# Patient Record
Sex: FEMALE | Race: WHITE | NOT HISPANIC OR LATINO
[De-identification: names, ages, dates, MRNs, and addresses within clinical notes are randomized per-mention and may not be internally consistent; named-entity substitution may affect disease eponyms.]

---

## 2017-01-31 ENCOUNTER — HOSPITAL ENCOUNTER (OUTPATIENT)
Dept: LAB | Age: 37
Setting detail: SPECIMEN
Discharge: HOME | End: 2017-01-31

## 2017-01-31 ENCOUNTER — OFFICE VISIT - HEALTHEAST (OUTPATIENT)
Dept: FAMILY MEDICINE | Facility: CLINIC | Age: 37
End: 2017-01-31

## 2017-01-31 DIAGNOSIS — J02.9 VIRAL PHARYNGITIS: ICD-10-CM

## 2017-01-31 DIAGNOSIS — J02.9 SORE THROAT: ICD-10-CM

## 2017-05-17 ENCOUNTER — OFFICE VISIT (OUTPATIENT)
Dept: URGENT CARE | Facility: URGENT CARE | Age: 37
End: 2017-05-17
Payer: COMMERCIAL

## 2017-05-17 VITALS
HEIGHT: 68 IN | WEIGHT: 144 LBS | DIASTOLIC BLOOD PRESSURE: 54 MMHG | HEART RATE: 83 BPM | BODY MASS INDEX: 21.82 KG/M2 | TEMPERATURE: 98.8 F | SYSTOLIC BLOOD PRESSURE: 90 MMHG | OXYGEN SATURATION: 98 %

## 2017-05-17 DIAGNOSIS — R07.0 THROAT PAIN: Primary | ICD-10-CM

## 2017-05-17 LAB
DEPRECATED S PYO AG THROAT QL EIA: NORMAL
MICRO REPORT STATUS: NORMAL
SPECIMEN SOURCE: NORMAL

## 2017-05-17 PROCEDURE — 87880 STREP A ASSAY W/OPTIC: CPT | Performed by: FAMILY MEDICINE

## 2017-05-17 PROCEDURE — 87081 CULTURE SCREEN ONLY: CPT | Performed by: FAMILY MEDICINE

## 2017-05-17 PROCEDURE — 99213 OFFICE O/P EST LOW 20 MIN: CPT | Performed by: FAMILY MEDICINE

## 2017-05-17 NOTE — NURSING NOTE
"Chief Complaint   Patient presents with     Urgent Care     Pharyngitis     Pt's kids have strep throat.  Has had sore throat x 1 week.       Initial BP 90/54  Pulse 83  Temp 98.8  F (37.1  C) (Oral)  Ht 5' 8\" (1.727 m)  Wt 144 lb (65.3 kg)  LMP 05/14/2017 (Exact Date)  SpO2 98%  BMI 21.9 kg/m2 Estimated body mass index is 21.9 kg/(m^2) as calculated from the following:    Height as of this encounter: 5' 8\" (1.727 m).    Weight as of this encounter: 144 lb (65.3 kg)..  BP completed using cuff size: small pepe Espana RN  "

## 2017-05-17 NOTE — MR AVS SNAPSHOT
"              After Visit Summary   2017    Karli Claros    MRN: [id number]           Patient Information     Date Of Birth          1980        Visit Information        Provider Department      2017 5:30 PM Rosi Robbins MD [facility]        Today's Diagnoses     Throat pain    -  1       Follow-ups after your visit        Follow-up notes from your care team     Return if symptoms worsen or fail to improve.      Who to contact     If you have questions or need follow up information about today's clinic visit or your schedule please contact [facility] directly at [phone].  Normal or non-critical lab and imaging results will be communicated to you by MyChart, letter or phone within 4 business days after the clinic has received the results. If you do not hear from us within 7 days, please contact the clinic through MyChart or phone. If you have a critical or abnormal lab result, we will notify you by phone as soon as possible.  Submit refill requests through Wize or call your pharmacy and they will forward the refill request to us. Please allow 3 business days for your refill to be completed.          Additional Information About Your Visit        MyChart Information     Wize lets you send messages to your doctor, view your test results, renew your prescriptions, schedule appointments and more. To sign up, go to [email or web address] . Click on \"Log in\" on the left side of the screen, which will take you to the Welcome page. Then click on \"Sign up Now\" on the right side of the page.     You will be asked to enter the access code listed below, as well as some personal information. Please follow the directions to create your username and password.     Your access code is: [email or web address]  Expires: 8/15/2017  7:03 PM     Your access code will  in 90 days. If you need help or a new code, please call your [place] " "clinic or [phone].        Care EveryWhere ID     This is your Care EveryWhere ID. This could be used by other organizations to access your [facility] medical records  [id number]        Your Vitals Were     Pulse Temperature Height Last Period Pulse Oximetry BMI (Body Mass Index)    83 98.8  F (37.1  C) (Oral) 5' 8\" (1.727 m) 05/14/2017 (Exact Date) 98% 21.9 kg/m2       Blood Pressure from Last 3 Encounters:   05/17/17 90/54   07/11/15 110/68    Weight from Last 3 Encounters:   05/17/17 144 lb (65.3 kg)   07/11/15 152 lb (68.9 kg)              We Performed the Following     Beta strep group A culture     Strep, Rapid Screen        Primary Care Provider    None Specified       No primary provider on file.        Thank you!     Thank you for choosing [facility]  for your care. Our goal is always to provide you with excellent care. Hearing back from our patients is one way we can continue to improve our services. Please take a few minutes to complete the written survey that you may receive in the mail after your visit with us. Thank you!             Your Updated Medication List - Protect others around you: Learn how to safely use, store and throw away your medicines at www.disposemymeds.org.          This list is accurate as of: 5/17/17  7:03 PM.  Always use your most recent med list.                   Brand Name Dispense Instructions for use    ADVIL PO            "

## 2017-05-18 LAB
BACTERIA SPEC CULT: NORMAL
MICRO REPORT STATUS: NORMAL
SPECIMEN SOURCE: NORMAL

## 2018-03-28 ENCOUNTER — OFFICE VISIT - HEALTHEAST (OUTPATIENT)
Dept: FAMILY MEDICINE | Facility: CLINIC | Age: 38
End: 2018-03-28

## 2018-03-28 DIAGNOSIS — H10.31 ACUTE BACTERIAL CONJUNCTIVITIS OF RIGHT EYE: ICD-10-CM

## 2018-04-02 ENCOUNTER — OFFICE VISIT - HEALTHEAST (OUTPATIENT)
Dept: FAMILY MEDICINE | Facility: CLINIC | Age: 38
End: 2018-04-02

## 2018-04-02 DIAGNOSIS — J02.9 ACUTE VIRAL PHARYNGITIS: ICD-10-CM

## 2018-04-02 DIAGNOSIS — R07.0 THROAT PAIN: ICD-10-CM

## 2018-04-02 LAB — DEPRECATED S PYO AG THROAT QL EIA: NORMAL

## 2018-04-03 LAB — GROUP A STREP BY PCR: NORMAL

## 2019-02-18 ENCOUNTER — HOSPITAL ENCOUNTER (OUTPATIENT)
Dept: ULTRASOUND IMAGING | Facility: HOSPITAL | Age: 39
Discharge: HOME | End: 2019-02-18
Attending: FAMILY MEDICINE

## 2019-02-18 ENCOUNTER — OFFICE VISIT - HEALTHEAST (OUTPATIENT)
Dept: FAMILY MEDICINE | Facility: CLINIC | Age: 39
End: 2019-02-18

## 2019-02-18 DIAGNOSIS — M25.562 POSTERIOR KNEE PAIN, LEFT: ICD-10-CM

## 2019-02-18 ASSESSMENT — MIFFLIN-ST. JEOR: SCORE: 1405.26

## 2019-04-23 ENCOUNTER — OFFICE VISIT - HEALTHEAST (OUTPATIENT)
Dept: FAMILY MEDICINE | Facility: CLINIC | Age: 39
End: 2019-04-23

## 2019-04-23 DIAGNOSIS — Z12.4 SCREENING FOR MALIGNANT NEOPLASM OF CERVIX: ICD-10-CM

## 2019-04-23 DIAGNOSIS — Z00.00 ROUTINE GENERAL MEDICAL EXAMINATION AT A HEALTH CARE FACILITY: ICD-10-CM

## 2019-04-23 DIAGNOSIS — Z13.0 SCREENING, IRON DEFICIENCY ANEMIA: ICD-10-CM

## 2019-04-23 DIAGNOSIS — Z83.49 FAMILY HISTORY OF HYPOTHYROIDISM: ICD-10-CM

## 2019-04-23 DIAGNOSIS — Z13.1 SCREENING FOR DIABETES MELLITUS: ICD-10-CM

## 2019-04-23 DIAGNOSIS — Z13.220 LIPID SCREENING: ICD-10-CM

## 2019-04-23 LAB
CHOLEST SERPL-MCNC: 175 MG/DL
FASTING STATUS PATIENT QL REPORTED: YES
FASTING STATUS PATIENT QL REPORTED: YES
GLUCOSE BLD-MCNC: 83 MG/DL (ref 70–99)
HDLC SERPL-MCNC: 51 MG/DL
HGB BLD-MCNC: 12.1 G/DL (ref 12–16)
LDLC SERPL CALC-MCNC: 109 MG/DL
TRIGL SERPL-MCNC: 77 MG/DL
TSH SERPL DL<=0.005 MIU/L-ACNC: 0.98 UIU/ML (ref 0.3–5)

## 2019-04-23 ASSESSMENT — MIFFLIN-ST. JEOR: SCORE: 1406.39

## 2019-04-24 LAB
HPV SOURCE: NORMAL
HUMAN PAPILLOMA VIRUS 16 DNA: NEGATIVE
HUMAN PAPILLOMA VIRUS 18 DNA: NEGATIVE
HUMAN PAPILLOMA VIRUS FINAL DIAGNOSIS: NORMAL
HUMAN PAPILLOMA VIRUS OTHER HR: NEGATIVE
SPECIMEN DESCRIPTION: NORMAL

## 2021-05-28 NOTE — PROGRESS NOTES
Assessment/Plan:     1. Routine general medical examination at a health care facility  Routine history and physical, updated in EMR.  Fasting labs updated as below.  Pap smear updated today.  Immunizations are up-to-date.  Plan repeat physical in 1 year.    2. Family history of hypothyroidism  Patient desires screening thyroid today.  She does have some dry skin, possibly fatigue.  She has a family history of hypothyroidism.  - Thyroid [place]    3. Screening for diabetes mellitus  Fasting blood sugar updated today.  Patient states she passed all of her glucose screens with her pregnancies.  - Glucose    4. Lipid screening  Fasting lipid profile updated today for baseline.  - Lipid [place] FASTING    5. Screening, iron deficiency anemia  Patient is currently menstruating, she does eat meat.  Screening hemoglobin performed today.  - Hemoglobin    6. Screening for malignant neoplasm of cervix  Routine Pap smear updated today.  - Gynecologic Cytology (PAP Smear)      Subjective:      Karli Claros is a 38 y.o. female who presents for an annual exam. The patient is sexually active. The patient participates in regular exercise: yes. The patient reports that there is not domestic violence in her life.  Patient overall has been feeling well and has no questions or concerns today.  She is due for an updated Pap smear.    Healthy Habits:   Regular Exercise: Yes  Sunscreen Use: Yes  Healthy Diet: Yes  Dental Visits Regularly: Yes  Seat Belt: Yes  Sexually active: Yes  Self Breast Exam Monthly:No  Lipid Profile: Yes  Glucose Screen: Yes      Immunization History   Administered Date(s) Administered     Influenza [id number], 11/03/2009     Influenza, Seasonal, Inj PF IIV3 09/29/2011     Influenza, inj, historic,unspecified 11/03/2009, 11/14/2013     Influenza, seasonal,quad inj 6-35 mos 12/17/2014     Tdap 06/29/2010, 01/23/2014     Immunization status: up to date and documented.    No exam data present    Gynecologic  History  Patient's last menstrual period was 04/16/2019.  Contraception: vasectomy  Last Pap: 9/26/13. Results were: normal and HPV neg      OB History   No data available       Current Outpatient Medications   Medication Sig Dispense Refill     magnesium oxide 400 mg cap Take by mouth.       No current facility-administered medications for this visit.      Past Medical History:   Diagnosis Date     Thrombocytopenia     Created by Conversion      Past Surgical History:   Procedure Laterality Date     RI DILATION/CURETTAGE,DIAGNOSTIC      Description: Dilation And Curettage;  Recorded: 10/06/2009;  Comments: for fetal demise     WISDOM TOOTH EXTRACTION       Patient has no known allergies.  Family History   Problem Relation Age of Onset     No Medical Problems Mother      No Medical Problems Father      Cancer Maternal Grandfather         leukemia     Breast cancer Neg Hx      Colon cancer Neg Hx      Diabetes Neg Hx      Heart disease Neg Hx      Social History     Socioeconomic History     Marital status:      Spouse name: Not on file     Number of children: Not on file     Years of education: Not on file     Highest education level: Not on file   Occupational History     Not on file   Social Needs     Financial resource strain: Not on file     Food insecurity:     Worry: Not on file     Inability: Not on file     Transportation needs:     Medical: Not on file     Non-medical: Not on file   Tobacco Use     Smoking status: Never Smoker     Smokeless tobacco: Never Used   Substance and Sexual Activity     Alcohol use: Yes     Alcohol/week: 0.0 - 1.2 oz     Drug use: No     Sexual activity: Yes     Partners: Male     Birth control/protection: None   Lifestyle     Physical activity:     Days per week: Not on file     Minutes per session: Not on file     Stress: Not on file   Relationships     Social connections:     Talks on phone: Not on file     Gets together: Not on file     Attends [employer] service: Not on  "file     Active member of club or organization: Not on file     Attends meetings of clubs or organizations: Not on file     Relationship status: Not on file     Intimate partner violence:     Fear of current or ex partner: Not on file     Emotionally abused: Not on file     Physically abused: Not on file     Forced sexual activity: Not on file   Other Topics Concern     Not on file   Social History Narrative     Not on file       Review of Systems  General:  Fatigue, not severe  Eyes: Denies problem  Ears/Nose/Throat: Denies problem  Cardiovascular: Denies problem  Respiratory:  Denies problem  Gastrointestinal:  Denies problem  Genitourinary: Denies problem  Musculoskeletal:  Denies problem  Skin: Mildly dry skin  Neurologic: Denies problem  Psychiatric: Denies problem  Endocrine: Denies problem  Heme/Lymphatic: Denies problem   Allergic/Immunologic: Denies problem        Objective:         Vitals:    04/23/19 0907   BP: 100/58   Pulse: 72   Resp: 16   Weight: 148 lb 1.6 oz (67.2 kg)   Height: 5' 8.7\" (1.745 m)     Body mass index is 22.06 kg/m .    Physical Exam:  General Appearance: Alert, cooperative, no distress, appears stated age  Head: Normocephalic, without obvious abnormality, atraumatic  Eyes: PERRL, conjunctiva/corneas clear, EOM's intact  Ears: Normal TM's and external ear canals, both ears  Nose: Nares normal, septum midline, mucosa normal, no drainage  Throat: Lips, mucosa, and tongue normal; teeth and gums normal  Neck: Supple, symmetrical, trachea midline, no adenopathy; thyroid: not enlarged, symmetric, no tenderness/mass/nodules  Back: Symmetric, no curvature, ROM normal, no CVA tenderness  Lungs: Clear to auscultation bilaterally, respirations unlabored  Breasts: No breast masses, tenderness, asymmetry, or nipple discharge  Heart: Regular rate and rhythm, S1 and S2 normal, no murmur, rub, or gallop  Abdomen: Soft, non-tender, bowel sounds active all four quadrants, no masses, no " organomegaly  Pelvic: Normally developed genitalia with no external lesions or eruptions. Vagina and cervix show no lesions, inflammation, discharge or tenderness. No cystocele, No rectocele. Uterus normal to palpation.  No adnexal mass or tenderness.  Extremities: Extremities normal, atraumatic, no cyanosis or edema  Skin: Skin color, texture, turgor normal, no rashes or lesions  Lymph nodes: Cervical, supraclavicular, and axillary nodes normal  Neurologic: Normal

## 2021-05-30 VITALS — BODY MASS INDEX: 22.5 KG/M2 | WEIGHT: 148 LBS

## 2021-06-01 VITALS — BODY MASS INDEX: 23.72 KG/M2 | WEIGHT: 156 LBS

## 2021-06-01 VITALS — BODY MASS INDEX: 23.11 KG/M2 | WEIGHT: 152 LBS

## 2021-06-02 VITALS — WEIGHT: 148.1 LBS | BODY MASS INDEX: 21.94 KG/M2 | HEIGHT: 69 IN

## 2021-06-02 VITALS — WEIGHT: 150.3 LBS | HEIGHT: 68 IN | BODY MASS INDEX: 22.78 KG/M2

## 2021-06-08 NOTE — PROGRESS NOTES
Subjective:      Patient ID: Karli Claros is a 36 y.o. female.    Chief Complaint:    HPI  Karli Claros is a 36 y.o. female who presents today complaining of sore throat and fatigue for 4 days.  She hasn't had fever that she knows of.  She did take Advil this morning.  No one else home has been ill.  She has had a little congestion but no cough.      Past Medical History   Diagnosis Date     Thrombocytopenia      Created by Conversion        Past Surgical History   Procedure Laterality Date     Pr dilation/curettage,diagnostic       Description: Dilation And Curettage;  Recorded: 10/06/2009;  Comments: for fetal demise     [place] tooth extraction         Family History   Problem Relation Age of Onset     No Medical Problems Mother      No Medical Problems Father      Cancer Maternal Grandfather      leukemia     Breast cancer Neg Hx      Colon cancer Neg Hx      Diabetes Neg Hx      Heart disease Neg Hx        Social History   Substance Use Topics     Smoking status: Never Smoker     Smokeless tobacco: Never Used     Alcohol use 0.0 - 1.2 oz/week     0 - 1 Glasses of wine, 0 - 1 Shots of liquor per week       Review of Systems    Objective:     Visit Vitals     /60     Pulse 65     Temp 98.2  F (36.8  C) (Oral)     Resp 20     Wt 148 lb (67.1 kg)     SpO2 99%     BMI 22.5 kg/m2       Physical Exam   Constitutional: She appears well-developed and well-nourished. No distress.   HENT:   Right Ear: Ear canal normal. Tympanic membrane is not erythematous, not retracted and not bulging. A middle ear effusion (clear fluid) is present.   Left Ear: Ear canal normal. Tympanic membrane is not erythematous and not retracted. A middle ear effusion (clear fluid) is present.   Mouth/Throat: Posterior oropharyngeal edema and posterior oropharyngeal erythema present. No oropharyngeal exudate or tonsillar abscesses.   Eyes: Conjunctivae are normal. Pupils are equal, round, and reactive to light.   Neck: Normal range of motion.  Neck supple.   Cardiovascular: Normal rate and regular rhythm.    No murmur heard.  Pulmonary/Chest: Effort normal and breath sounds normal. No respiratory distress. She has no wheezes. She has no rales.   Lymphadenopathy:     She has cervical adenopathy (shotty).     Procedures    Results for orders placed or performed in visit on 01/31/17   Rapid Strep A Screen-Throat   Result Value Ref Range    Rapid Strep A Antigen No Group A Strep detected No Group A Strep detected        Assessment / Plan:     1. Viral pharyngitis           Patient Instructions   Suspect probable viral etiology.    1) Confirmatory testing is pending.    2) Ibuprofen every 6 hours or Tylenol every 4 hours as needed for fever/discomfort.  3) Follow up in 7-10 days if not improving, sooner if worsening or other concerns.

## 2021-06-16 PROBLEM — Z83.49 FAMILY HISTORY OF HYPOTHYROIDISM: Status: ACTIVE | Noted: 2019-04-23

## 2021-06-16 NOTE — PROGRESS NOTES
Assessment:       Acute conjunctivitis, right       Plan:       Discussed the diagnosis and proper care of conjunctivitis.  Stressed household hygiene.  Ophthalmic drops per orders.   Discussed discarding contact lens case, cleaning/disposing of contact lenses, and discontinuing contact lens use until symptom improvement for at least 24 hours  Discussed signs of worsening infection and when to follow-up with PCP if no symptom improvement.    Patient Instructions   You were seen today for conjunctivitis. If you wear contact lenses, discontinue use until eye is white and no discharge for 24 hours. Lens case should be discarded and lenses subjected to overnight disinfection or replaced if disposable.    Management:  - Apply antibiotic drops as prescribed until 24 hours of no symptoms  - Use warm compresses to clear discharge and crust  - Encourage good hand hygiene with frequent hand washing  - Avoid itching or rubbing the eye    Reasons to come back:  - If symptoms have not improved in 3 days  - Develop excessive pus-like discharge and/or can't keep eyes open  - Develop a fever, cough, ear pain, or shortness of breath          Subjective:       History provided by patient.  Karli Claros is a 37 y.o. female who presents for evaluation of eye redness. She has noticed the above symptoms in the right eye for 2 days. Onset was gradual. Symptoms have included discharge, itching and pain. Patient denies blurred vision, foreign body sensation, photophobia and visual field deficit. Patient has history of occasional contact lens use, last use was 4 days ago.    The following portions of the patient's history were reviewed and updated as appropriate: allergies, current medications and problem list.    Review of Systems  Pertinent items are noted in HPI.     Allergies  No Known Allergies       Objective:       /72  Pulse 89  Temp 97.7  F (36.5  C) (Oral)   Resp 14  Wt 156 lb (70.8 kg)  SpO2 99%  Breastfeeding? No   BMI 23.72 kg/m2            General: alert, appears stated age, cooperative, no distress and non-toxic   Eyes:  right: conjunctivae erythematous, sclera injected. left: conjunctivae/sclera clear. PERRL. EOM intact   Vision: corrected L 10/12.5   R 10/12.5   Fluorescein:  not done

## 2021-06-24 NOTE — PROGRESS NOTES
"  Assessment/Plan:      Posterior knee pain, left  Patient's pain is most consistent with a popliteal cyst or Baker's cyst.  I do not really appreciate a lot of swelling on examination today, but she does have some pain with full flexion and more pain with weightbearing she states.  For this reason, we will investigate further with an ultrasound.  In the meantime, she will take anti-inflammatories as needed, try to avoid activities that exacerbate the pain.  - US Leg Non Vascular Left; Future        Subjective:       Karli Claros is a 38 y.o. female who presents for evaluation of swelling and discomfort behind her left knee for the past 3-4 weeks.  She has never had a history of the same.  She cannot recall any specific injury.  She describes more pain with weightbearing, especially when she flexes with weightbearing, for instance going down stairs.  She did have some burning discomfort in the back of the knee yesterday.  She is most concerned that this may represent a blood clot.  She has never had a blood clot in the past.  She has no asymmetric swelling of her calves, no calf pain.  She has had no prolonged immobilization.  There is no redness to the legs.    The following portions of the patient's history were reviewed and updated as appropriate: allergies, current medications, past medical history and problem list.      Current Outpatient Medications:      alum/mag hydrox-simethicone-diphenhydramine-lidocaine (MAGIC MOUTHWASH) suspension, Gargle and spit out 15 cc ( 1 tablespoon) every 4-6 hours, but no more than 4 doses in 24 hours, Disp: 120 mL, Rfl: 0    Review of Systems   Pertinent items are noted in HPI.      Objective:      /70 (Patient Site: Left Arm, Patient Position: Sitting, Cuff Size: Adult Regular)   Pulse 72   Resp 16   Ht 5' 8\" (1.727 m)   Wt 150 lb 4.8 oz (68.2 kg)   LMP 01/29/2019   Breastfeeding? No   BMI 22.85 kg/m      General appearance: alert, appears stated age and " cooperative  Extremities: Mild tenderness to palpation in the popliteal fossa of the left posterior knee, I do not appreciate a lot of asymmetric swelling in that area.  No calf tenderness bilaterally, no erythema or lower extremity edema.   Musculoskeletal: Leti testing negative bilaterally, Lachmann and Drawer testing negative.

## 2021-06-26 NOTE — PROGRESS NOTES
Progress Notes by Elizabeth Parry MD at 4/2/2018  1:50 PM     Author: Elizabeth Parry MD Service: -- Author Type: Physician    Filed: 4/2/2018  4:31 PM Encounter Date: 4/2/2018 Status: Signed    : Elizabeth Parry MD (Physician)         Subjective:   Karli Claros is a 37 y.o. female  No question data found.  Chief Complaint   Patient presents with   ? poss sore throat     x 1weeks      Says 2 of 3 children tested + for strep in the last month. Denies any recent fever, but admits intermittent headaches, low energy. Appetite has been normal. Denies nausea, vomiting, diarrhea or belly pain. Admits 1 day of green nasal drainage, nasal congestion and starting to cough. Denies any CP or SOB.     Review of Systems  Const - Resp - see HPI  No Known Allergies    Current Outpatient Prescriptions:   ?  ciprofloxacin HCl (CILOXAN) 0.3 % ophthalmic solution, Administer 1 drop, every 2 hours, while awake, for 2 days. Then 1 drop, every 4 hours, while awake, for the next 5 days., Disp: 5 mL, Rfl: 0  Patient Active Problem List   Diagnosis   ? Thrombocytopenia     Medical History Reviewed  Objective:     Vitals:    04/02/18 1412   Pulse: 73   Resp: 15   Temp: 98  F (36.7  C)   TempSrc: Oral   SpO2: 99%   Weight: 152 lb (68.9 kg)   Gen - Pt in NAD  Eyes - Conjunctiva non injected, no drainage  Face - non TTP over frontal sinus areas; non TTP over maxillary area  Ears - external canals - no induration, Right TM - not injected, Left TM - not injected   Nose - not congested, no nasal drainage  Pharynx - non injected, tonsils 1+ size  Neck - supple, no cervical adenopathy, no masses  Cor - RRR w/o murmur  Lungs - Good air entry; no wheezes or crackles noted on auscultation - no coughing noted  Skin - no lesions, no rashes noted    Results for orders placed or performed in visit on 04/02/18   Rapid Strep A Screen-Throat   Result Value Ref Range    Rapid Strep A Antigen No Group A Strep detected, presumptive negative No Group A  Strep detected, presumptive negative   Lab result discussed on day of visit.     Assessment - Plan   Medical Decision Making - No clinical findings indicative of serious bacterial infection, such as pneumonia, sinusitis or otitis media were ascertained from today's evaluation. Presentation and clinical findings are consistent with a viral process.     1. Acute viral pharyngitis  - Group A Strep, RNA Direct Detection, Throat    2. Throat pain  - Rapid Strep A Screen-Throat  - alum/mag hydrox-simethicone-diphenhydramine-lidocaine (MAGIC MOUTHWASH) suspension; Gargle and spit out 15 cc ( 1 tablespoon) every 4-6 hours, but no more than 4 doses in 24 hours  Dispense: 120 mL; Refill: 0    At the conclusion of the encounter, assessment and plan were discussed.   All questions were answered.   The patient or guardian acknowledged understanding and was involved in the decision making regarding the overall care plan.    Patient Instructions     1. Keep well hydrated  2. May alternate Tylenol every 6 hours with ibuprofen every 6 hours as needed for fever or pain  3. If symptoms are not improving over the next week, follow up with primary provider  4. If you have any questions, call the clinic number  - it's answered 24/7  - You will be contacted within the next 48 hours ONLY if the confirmatory strep test is positive.   - Antibiotics will be prescribed if indicated.  - No sharing of food or beverage, until 48 hours is past       Self-Care for Sore Throats  Sore throats happen for many reasons, such as colds, allergies, and infections caused by viruses or bacteria. In any case, your throat becomes red and sore. Your goal for self-care is to reduce your discomfort while giving your throat a chance to heal.  Moisten and soothe your throat  Tips include the following:    Try a sip of water first thing after waking up.    Keep your throat moist by drinking 6 or more glasses of clear liquids every day.    Run a cool-air humidifier  in your room overnight.    Avoid cigarette smoke.     Suck on throat lozenges, cough drops, hard candy, ice chips, or frozen fruit-juice bars. Use the sugar-free versions if your diet or medical condition requires them.  Gargle to ease irritation  Gargling every hour or 2 can ease irritation. Try gargling with 1 of these solutions:    1/4 teaspoon of salt in 1/2 cup of warm water    An over-the-counter anesthetic gargle  Use medicine for more relief  Over-the-counter medicine can reduce sore throat symptoms. Ask your pharmacist if you have questions about which medicine to use:    Ease pain with anesthetic sprays. Aspirin or an aspirin substitute also helps. Remember, never give aspirin to anyone 18 or younger, or if you are already taking blood thinners.     For sore throats caused by allergies, try antihistamines to block the allergic reaction.    Remember: unless a sore throat is caused by a bacterial infection, antibiotics wont help you.  Prevent future sore throats  Prevention tips include the following:    Stop smoking or reduce contact with secondhand smoke. Smoke irritates the tender throat lining.    Limit contact with pets and with allergy-causing substances, such as pollen and mold.    When youre around someone with a sore throat or cold, wash your hands often to keep viruses or bacteria from spreading.    Dont strain your vocal cords.  Call your healthcare provider  Contact your healthcare provider if you have:    A temperature over 101 F (38.3 C)    White spots on the throat    Great difficulty swallowing    Trouble breathing    A skin rash    Recent exposure to someone else with strep bacteria    Severe hoarseness and swollen glands in the neck or jaw   Date Last Reviewed: 8/1/2016 2000-2016 [employer]. [address] [place] [address], [place] [address]. All rights reserved. This information is not intended as a substitute for professional medical care. Always follow your healthcare  professional's instructions.

## 2021-08-08 ENCOUNTER — HEALTH MAINTENANCE LETTER (OUTPATIENT)
Age: 41
End: 2021-08-08

## 2021-10-03 ENCOUNTER — HEALTH MAINTENANCE LETTER (OUTPATIENT)
Age: 41
End: 2021-10-03

## 2022-09-11 ENCOUNTER — HEALTH MAINTENANCE LETTER (OUTPATIENT)
Age: 42
End: 2022-09-11

## 2023-12-16 ENCOUNTER — HEALTH MAINTENANCE LETTER (OUTPATIENT)
Age: 43
End: 2023-12-16

## 2024-02-24 ENCOUNTER — HEALTH MAINTENANCE LETTER (OUTPATIENT)
Age: 44
End: 2024-02-24